# Patient Record
Sex: FEMALE | Race: WHITE | URBAN - METROPOLITAN AREA
[De-identification: names, ages, dates, MRNs, and addresses within clinical notes are randomized per-mention and may not be internally consistent; named-entity substitution may affect disease eponyms.]

---

## 2018-01-19 ENCOUNTER — APPOINTMENT (OUTPATIENT)
Dept: URBAN - METROPOLITAN AREA CLINIC 211 | Age: 23
Setting detail: DERMATOLOGY
End: 2018-01-19

## 2018-01-19 ENCOUNTER — APPOINTMENT (OUTPATIENT)
Dept: URBAN - METROPOLITAN AREA CLINIC 211 | Age: 23
Setting detail: DERMATOLOGY
End: 2018-01-24

## 2018-01-19 DIAGNOSIS — Z41.9 ENCOUNTER FOR PROCEDURE FOR PURPOSES OTHER THAN REMEDYING HEALTH STATE, UNSPECIFIED: ICD-10-CM

## 2018-01-19 PROBLEM — L85.3 XEROSIS CUTIS: Status: ACTIVE | Noted: 2018-01-19

## 2018-01-19 PROBLEM — L20.84 INTRINSIC (ALLERGIC) ECZEMA: Status: ACTIVE | Noted: 2018-01-19

## 2018-01-19 PROBLEM — L70.0 ACNE VULGARIS: Status: ACTIVE | Noted: 2018-01-19

## 2018-01-19 PROCEDURE — OTHER MIPS QUALITY: OTHER

## 2018-01-19 PROCEDURE — OTHER DEFER: OTHER

## 2018-01-19 PROCEDURE — OTHER OTHER (COSMETIC): OTHER

## 2018-01-19 ASSESSMENT — LOCATION DETAILED DESCRIPTION DERM: LOCATION DETAILED: RIGHT INFERIOR CENTRAL MALAR CHEEK

## 2018-01-19 ASSESSMENT — LOCATION ZONE DERM: LOCATION ZONE: FACE

## 2018-01-19 ASSESSMENT — LOCATION SIMPLE DESCRIPTION DERM: LOCATION SIMPLE: RIGHT CHEEK

## 2018-01-19 NOTE — PROCEDURE: OTHER (COSMETIC)
Other (Free Text): Cosmetic Consultation\\nPE:\\n\\nPLAN:\\n1.\\n2.\\n3.\\n\\nNOTE:\\Lelia indications, treatment expectations (including management of any possible irritations), protocols, risks and benefits, pre/post care are reviewed. Details of these can be found on the appropriate attached informed consent documentation. Patient understands that multiple treatments may be necessary for optimum results and that ongoing maintenance with at-home products and additional office visits or treatments may be needed to enhance and extend the desired results.\\nAnswered all questions\\nRTC-\\nnumbing 40mins prior to IPL/Lasers
Detail Level: Zone

## 2018-01-19 NOTE — PROCEDURE: DEFER
Detail Level: Detailed
Instructions (Optional): patient presnts today to est care to CC. Neto denies haveing any dermatological concerns at this time.

## 2018-01-19 NOTE — HPI: OTHER
Condition:: Cosmetic
Please Describe Your Condition:: Patient presents here today for cosmetic consult with Elizabeth.\\nPain 0/10.

## 2018-01-19 NOTE — HPI: OTHER
Condition:: Cosmetic
Please Describe Your Condition:: Pt is interested in treatment options for acne scarring on her cheeks.  Pt states she has previously been on Accutane several years ago.  Pt states that her acne is pretty well controlled.  She gets some outbreaks around her cycle.  Medications, allergies and medical history were reviewed and no changes were noted.

## 2018-01-19 NOTE — PROCEDURE: MIPS QUALITY
Quality 226: Preventive Care And Screening: Tobacco Use: Screening And Cessation Intervention: Patient screened for tobacco and never smoked
Quality 431: Preventive Care And Screening: Unhealthy Alcohol Use - Screening: Patient screened for unhealthy alcohol use using a single question and scores less than 2 times per year
Detail Level: Detailed
Quality 110: Preventive Care And Screening: Influenza Immunization: Influenza immunization was not ordered or administered, reason not given
Quality 130: Documentation Of Current Medications In The Medical Record: Current Medications Documented
Quality 131: Pain Assessment And Follow-Up: Pain assessment using a standardized tool is documented as negative, no follow-up plan required

## 2018-03-02 ENCOUNTER — CONSULT (OUTPATIENT)
Dept: URBAN - NONMETROPOLITAN AREA CLINIC 6 | Facility: CLINIC | Age: 23
Setting detail: DERMATOLOGY
End: 2018-03-02

## 2018-03-02 DIAGNOSIS — C44.519 BASAL CELL CARCINOMA OF SKIN OF OTHER PART OF TRUNK: ICD-10-CM

## 2018-03-02 PROCEDURE — 99201 LEVEL 1 - NEW: CPT

## 2018-03-02 RX ORDER — SPIRONOLACTONE 25 MG/1
1 TABLET TABLET, FILM COATED ORAL BID
Qty: 60 | Refills: 2
Start: 2018-03-02

## 2018-03-05 PROBLEM — L90.5 SCAR CONDITIONS AND FIBROSIS OF SKIN: Status: RESOLVED | Noted: 2018-03-05
